# Patient Record
Sex: FEMALE | Race: WHITE
[De-identification: names, ages, dates, MRNs, and addresses within clinical notes are randomized per-mention and may not be internally consistent; named-entity substitution may affect disease eponyms.]

---

## 2018-02-16 ENCOUNTER — HOSPITAL ENCOUNTER (OUTPATIENT)
Dept: HOSPITAL 47 - RADMRIMAIN | Age: 43
Discharge: HOME | End: 2018-02-16
Payer: COMMERCIAL

## 2018-02-16 DIAGNOSIS — M25.412: Primary | ICD-10-CM

## 2018-02-16 NOTE — MR
EXAMINATION TYPE: MR shoulder LT wo con

 

DATE OF EXAM: 2/16/2018

 

COMPARISON: 10/18/2013

 

HISTORY: Left Shoulder pain with Limited ROM, Previous MRI and XRays on PACS

 

TECHNIQUE: Multiplanar, multisequence imaging of the left shoulder is performed without contrast.

 

FINDINGS:

 

There is small shoulder joint effusion. The glenoid danya appear intact. Subscapularis tendon is inta
ct. There is very mild increased signal in the supraspinatus tendon over the greater tuberosity of th
e humerus. I do not see a full-thickness tear. There is no retraction. There is no subacromial imping
ement. The biceps tendon is intact.

IMPRESSION: 

Minimal increased signal in the supraspinatus tendon consistent with mild tendinitis. No evidence of 
a full-thickness rotator cuff tear. There is a mild shoulder joint effusion slightly increased compar
ed to old exam.